# Patient Record
Sex: MALE | Race: WHITE | NOT HISPANIC OR LATINO | Employment: FULL TIME | URBAN - METROPOLITAN AREA
[De-identification: names, ages, dates, MRNs, and addresses within clinical notes are randomized per-mention and may not be internally consistent; named-entity substitution may affect disease eponyms.]

---

## 2019-06-03 ENCOUNTER — TRANSFERRED RECORDS (OUTPATIENT)
Dept: HEALTH INFORMATION MANAGEMENT | Facility: CLINIC | Age: 39
End: 2019-06-03

## 2019-09-17 ENCOUNTER — OFFICE VISIT (OUTPATIENT)
Dept: FAMILY MEDICINE | Facility: CLINIC | Age: 39
End: 2019-09-17
Payer: COMMERCIAL

## 2019-09-17 VITALS
DIASTOLIC BLOOD PRESSURE: 86 MMHG | RESPIRATION RATE: 16 BRPM | WEIGHT: 167 LBS | SYSTOLIC BLOOD PRESSURE: 138 MMHG | OXYGEN SATURATION: 99 % | TEMPERATURE: 98.5 F | HEIGHT: 69 IN | BODY MASS INDEX: 24.73 KG/M2 | HEART RATE: 79 BPM

## 2019-09-17 DIAGNOSIS — F90.9 ATTENTION DEFICIT HYPERACTIVITY DISORDER (ADHD), UNSPECIFIED ADHD TYPE: ICD-10-CM

## 2019-09-17 DIAGNOSIS — Z00.00 ENCOUNTER FOR PREVENTIVE HEALTH EXAMINATION: Primary | ICD-10-CM

## 2019-09-17 PROCEDURE — 99385 PREV VISIT NEW AGE 18-39: CPT | Performed by: PHYSICIAN ASSISTANT

## 2019-09-17 RX ORDER — METHYLPHENIDATE HYDROCHLORIDE 54 MG/1
54 TABLET ORAL DAILY
Qty: 30 TABLET | Refills: 0 | Status: SHIPPED | OUTPATIENT
Start: 2019-10-03 | End: 2019-09-17

## 2019-09-17 RX ORDER — METHYLPHENIDATE HYDROCHLORIDE 54 MG/1
54 TABLET ORAL DAILY
Refills: 0 | COMMUNITY
Start: 2019-09-03 | End: 2019-09-17

## 2019-09-17 RX ORDER — METHYLPHENIDATE HYDROCHLORIDE 54 MG/1
54 TABLET ORAL DAILY
Qty: 30 TABLET | Refills: 0 | Status: SHIPPED | OUTPATIENT
Start: 2019-11-03 | End: 2019-09-17

## 2019-09-17 RX ORDER — METHYLPHENIDATE HYDROCHLORIDE 54 MG/1
54 TABLET ORAL DAILY
Qty: 30 TABLET | Refills: 0 | Status: SHIPPED | OUTPATIENT
Start: 2019-12-03 | End: 2020-03-03

## 2019-09-17 SDOH — HEALTH STABILITY: MENTAL HEALTH: HOW OFTEN DO YOU HAVE A DRINK CONTAINING ALCOHOL?: NEVER

## 2019-09-17 ASSESSMENT — MIFFLIN-ST. JEOR: SCORE: 1662.89

## 2019-09-17 NOTE — PROGRESS NOTES
"Subjective     Loki Aguirre is a 39 year old male who presents to clinic today for the following health issues:    HPI     Concerta med check and refill.  Was started It about 1.5 yrs ago. Was initially started in [place].   Did have evaluation in [place], no copies at times of visit. Is going to have records sent.  No weight concerns or sleeping difficulties.      Also establish care with PCP      Patient Active Problem List   Diagnosis     Attention deficit hyperactivity disorder (ADHD), unspecified ADHD type     Past Surgical History:   Procedure Laterality Date     wisdom teeth         Social History     Tobacco Use     Smoking status: Never Smoker     Smokeless tobacco: Never Used   Substance Use Topics     Alcohol use: Not Currently     Frequency: Never     Family History   Problem Relation Age of Onset     Breast Cancer Mother      Depression Mother      Hypertension Mother      Arthritis Mother      Cataracts Father      Lupus Maternal Grandmother      Neurologic Disorder Paternal Grandfather          Current Outpatient Medications   Medication Sig Dispense Refill     [START ON 12/3/2019] methylphenidate (CONCERTA) 54 MG CR tablet Take 1 tablet (54 mg) by mouth daily 30 tablet 0     No Known Allergies    Reviewed and updated as needed this visit by Provider  Tobacco  Allergies  Meds  Problems  Med Hx  Surg Hx  Fam Hx         Review of Systems   ROS COMP: Constitutional, HEENT, cardiovascular, pulmonary, gi and gu systems are negative, except as otherwise noted.      Objective    /86   Pulse 79   Temp 98.5  F (36.9  C) (Oral)   Resp 16   Ht 1.753 m (5' 9\")   Wt 75.8 kg (167 lb)   SpO2 99%   BMI 24.66 kg/m    Body mass index is 24.66 kg/m .  Physical Exam   GENERAL: healthy, alert and no distress  EYES: Eyes grossly normal to inspection, PERRL and conjunctivae and sclerae normal  HENT: ear canals and TM's normal, nose and mouth without ulcers or lesions  NECK: no adenopathy, no asymmetry, masses, " or scars and thyroid normal to palpation  RESP: lungs clear to auscultation - no rales, rhonchi or wheezes  CV: regular rate and rhythm, normal S1 S2, no S3 or S4, no murmur, click or rub, no peripheral edema and peripheral pulses strong  ABDOMEN: soft, nontender, no hepatosplenomegaly, no masses and bowel sounds normal   (male): normal male genitalia without lesions or urethral discharge, no hernia  MS: no gross musculoskeletal defects noted, no edema  SKIN: no suspicious lesions or rashes  NEURO: Normal strength and tone, mentation intact and speech normal  PSYCH: mentation appears normal, affect normal/bright    Diagnostic Test Results:  none   Just had them done in [place]. No records at time of visit.         Assessment & Plan       ICD-10-CM    1. Encounter for preventive health examination Z00.00    2. Attention deficit hyperactivity disorder (ADHD), unspecified ADHD type F90.9 methylphenidate (CONCERTA) 54 MG CR tablet     DISCONTINUED: methylphenidate (CONCERTA) 54 MG CR tablet     DISCONTINUED: methylphenidate (CONCERTA) 54 MG CR tablet   1. Work on Healthy diet and exercise. Getting heart rate elevated for 30 mins most days of week.  Recheck yearly.   2. Ok for refills. Follow up  6 months. warning signs discussed.     Return in about 6 months (around 3/17/2020) for ADHD Check.    Yusuf Vargas PA-C  [facility]

## 2019-12-23 ENCOUNTER — TELEPHONE (OUTPATIENT)
Dept: FAMILY MEDICINE | Facility: CLINIC | Age: 39
End: 2019-12-23

## 2019-12-23 NOTE — TELEPHONE ENCOUNTER
Patient calling is on methylphenidate, after first of year his insurance will no longer cover this. Wondering if Yusuf would sent a statement of need to insurance or does he think patient should try a covered medication. Patient does not have a list of covered meds. Patient states he has 1 refill left.

## 2019-12-23 NOTE — TELEPHONE ENCOUNTER
Patient states is picking up last refill on his methylphenidate tomorrow.  He states that he's been on the med for approx 2 years; is only med he's used for his adhd.  He will contact his insurance company and find out which medications are covered by them for adhd and then he will leave his provider a message with the information.  Sania Hernandez RN

## 2020-03-02 ENCOUNTER — TELEPHONE (OUTPATIENT)
Dept: FAMILY MEDICINE | Facility: CLINIC | Age: 40
End: 2020-03-02

## 2020-03-02 DIAGNOSIS — J02.9 SORE THROAT: Primary | ICD-10-CM

## 2020-03-02 NOTE — TELEPHONE ENCOUNTER
Reason for Call:  Other prescription    Detailed comments: Patient wanted to give Oppel a heads up that the patient will be weaning off of his medication: methylphenidate (CONCERTA) 54 MG.   Patient will be off of his wife's insurance and will not have any until he gets his own. Patient did want to mention that he is not having any symptoms or side affects as of now.     Patient stated that he is now living with other relatives and the phone number changed as well. Both are updated on file    Phone Number Patient can be reached at: Cell number on file:    Telephone Information:   Mobile [phone]       Best Time: Any    Can we leave a detailed message on this number? YES    Call taken on 3/2/2020 at 4:48 PM by Georgia Melgar

## 2020-03-02 NOTE — TELEPHONE ENCOUNTER
Left message on answering machine for patient to call back. Sania FELIX, [phone]  Per :  concerta last refilled 12/23/19 #30

## 2020-03-03 NOTE — TELEPHONE ENCOUNTER
"Patient is in process of weaning off the concerta 54mg CR tab. Patient is going through a divorce and his insurance is currently through his wife.  Once divorce finalized he will not have insurance to cover.  He states that his current insurance also let him know med no longer covered under wife's insurance.  He is not requesting PA or alternate med at this time.  He last filled #30 on 12/3/19.  He states he has approx 10 pills left; has been taking one tab every 2-3 days when he works and not at all on weekends.  He states is having no side effects that he can tell from weaning off med.  Wondering if he should continue just taking the med every 2-3 days and then stop when done with pills or if Yusuf Vargas PA-C feels should do another way (has 10 pills; is not refilling).  He states is \"homeless\".  Living with alternate family members currently in [facility].  Has applied for housing and waiting to hear back.  He is looking at joining [employer] and states awareness that he cannot be on the Concerta or have it in his system (they do drug testing with application).    States ok for staff to leave detailed message on his voicemail with instructions from his provider.  Sania Hernandez RN    "

## 2020-03-03 NOTE — TELEPHONE ENCOUNTER
I called and spoke with patient.  Gave him Yusuf Vargas PA-C's instructions as per below.  He states understanding.  Sania Hernandez RN

## 2020-03-30 ENCOUNTER — VIRTUAL VISIT (OUTPATIENT)
Dept: FAMILY MEDICINE | Facility: OTHER | Age: 40
End: 2020-03-30

## 2020-03-30 NOTE — PROGRESS NOTES
"Date: 2020 11:21:42  Clinician: Fara Cano  Clinician NPI: [id number]  Patient: Loki Aguirre  Patient : 1980  Patient Address: [address] [place] [address]  Patient Phone: [phone]  Visit Protocol: URI  Patient Summary:  Loki is a 39 year old ( : 1980 ) male who initiated a Visit for COVID-19 (Coronavirus) evaluation and screening. When asked the question \"Please sign me up to receive news, health information and promotions from [employer].\", Loki responded \"Yes\".    Loki states his symptoms started 1-2 days ago.   His symptoms consist of a headache, facial pain or pressure, myalgia, chills, wheezing, a sore throat, a cough, nasal congestion, malaise, and enlarged lymph nodes. He is experiencing mild difficulty breathing with activities but can speak normally in full sentences. Loki also feels feverish but was unable to measure his temperature.   Symptom details     Nasal secretions: The color of his mucus is blood-tinged.    Cough: Loki coughs every 5-10 minutes and his cough is more bothersome at night. Phlegm comes into his throat when he coughs. He does not believe his cough is caused by post-nasal drip. The color of the phlegm is blood-tinged.     Sore throat: Loki reports having moderate throat pain (4-6 on a 10 point pain scale), has exudate on his tonsils, and can swallow liquids. The lymph nodes in his neck are enlarged. A rash has not appeared on the skin since the sore throat started.     Wheezing: Lkoi has been diagnosed with asthma. The wheezing interferes with his normal daily activities.    Facial pain or pressure: The facial pain or pressure does not feel worse when bending or leaning forward.     Headache: He states the headache is moderate (4-6 on a 10 point pain scale).      Loki denies having ear pain, rhinitis, and teeth pain. He also denies having a sinus infection within the past year, taking antibiotic medication for the symptoms, and " having recent facial or sinus surgery in the past 60 days.   Precipitating events  Within the past week, Loki has not been exposed to someone with strep throat. He has recently been exposed to someone with influenza. Loki has been in close contact with the following high risk individuals: people with asthma, heart disease or diabetes, adults 65 or older, and immunocompromised people.   Pertinent COVID-19 (Coronavirus) information  Loki has traveled internationally or to the areas where COVID-19 (Coronavirus) is widespread, including cruise ship travel in the last 14 days before the start of his symptoms. Countries or locations traveled as reported by the patient (free text): No out of state travel.  But I was on a business trip in [place], [place] last week.  A person left from one of the grocery stores I was shopping at with uncontrollable coughing that I was in line with.  I also had a few home visits with clients in the [place] area.   Loki has not had a close contact with a laboratory-confirmed COVID-19 patient within 14 days of symptom onset. He also has not had a close contact with a suspected COVID-19 patient within 14 days of symptom onset.   Loki is not a healthcare worker or a  and does not work in a healthcare facility. He does not live with a healthcare worker.   Triage Point(s) temporarily suspended for COVID-19 (Coronavirus) screening  Loki reported the following symptoms which were previously protocol referral points. These protocol referral points have temporarily been removed for purposes of COVID-19 (Coronavirus) screening.   Wheezing that keeps Loki from doing daily activities   Pertinent medical history  Loik needs a return to work/school note.   Weight: 165 lbs   Loki does not smoke or use smokeless tobacco.   Additional information as reported by the patient (free text): I have been homeless since [place] because I am going through a divorce.  I used to have an  inhaler in the past, but it  in .  I unable to be in complete isolation as I live in my car and still need to use a public restroom.  I was living with my elderly aunt and uncle but removed myself two days before symptoms started.  I feel a constant pain in my upper right lung area that increases with coughing.  My symptoms have increased rapidly since last night.   Weight: 165 lbs    MEDICATIONS: No current medications, ALLERGIES: NKDA  Clinician Response:  Dear Loki,   Based on the information you have provided, you do have symptoms that are consistent with Coronavirus (COVID-19).  The coronavirus causes mild to severe respiratory illness with the most common symptoms including fever, cough and difficulty breathing. Unfortunately, many viruses cause similar symptoms and it can be difficult to distinguish between viruses, especially in mild cases, so we are presuming that anyone with cough or fever has coronavirus at this time.  Coronavirus/COVID-19 has reached the point of community spread in [place], meaning that we are finding the virus in people with no known exposure risk for kamryn the virus. Given the increasing commonness of coronavirus in the community we are no longer testing patients who are not critically ill.  If you are a health care worker, you should refer to your employee health office for instructions about testing and returning to work.  For everyone else who has cough or fever, you should assume you are infected with coronavirus. Since you will not be tested but have symptoms that may be consistent with coronavirus, the CDC recommends you stay in self-isolation until these three things have happened:    You have had no fever for at least 72 hours (that is three full days of no fever without the use of medicine that reduces fevers)    AND   Other symptoms have improved (for example, when your cough or shortness of breath have improved)   AND   At least 7 days have passed since  your symptoms first appeared.   How to Isolate:   Isolate yourself at home.  Do Not allow any visitors  Do Not go to work or school  Do Not go to [employer],  centers, shopping, or other public places.  Do Not shake hands.  Avoid close contact with others (hugging, kissing).   Protect Others:   Cover Your Mouth and Nose with a mask, disposable tissue or wash cloth to avoid spreading germs to others.  Wash your hands and face frequently with soap and water.   We know it can be scary to hear that you might have COVID-19. Our team can help track your symptoms and make sure you are doing ok over the next two weeks using a program called [employer] to keep in touch. When you receive an email from [employer], please consider enrolling in our monitoring program. There is no cost to you for monitoring. Here is a URL where you can learn more: [email or web address]/333493  Managing Symptoms:   At this time, we primarily recommend Tylenol (Acetaminophen) for fever or pain. If you have liver or kidney problems, contact your primary care provider for instructions on use of tylenol. Adults can take 650 mg (two 325 mg pills) by mouth every 4-6 hours as needed OR 1,000 mg (two 500 mg pills) every 8 hours as needed. MAXIMUM DAILY DOSE: 3,000mg. For children, refer to dosing on bottle based on age or weight.   If you develop significant shortness of breath that prevents you from doing normal activities, please call 911 or proceed to the nearest emergency room and alert them immediately that you have been in self-isolation for possible coronavirus.  If you have a higher risk medical condition such as cancer, heart failure, end stage renal disease on dialysis or have a transplant, please reach out to your specialist's clinic to advise them of your [facility] visit should you not improve within the next two days.   For more information about COVID19 and options for caring for yourself at home, please visit the CDC website at  https://www.cdc.gov/coronavirus/2019-ncov/about/steps-when-sick.htmlFor more options for care at [facility], please visit our website at [email or web address]    COVID-19 (Coronavirus) General Information  With the increase in the number of COVID-19 (Coronavirus) cases, we understand you may have some questions. Below is some helpful information on COVID-19 (Coronavirus).  How can I protect myself and others from the COVID-19 (Coronavirus)?  Because there is currently no vaccine to prevent infection, the best way to protect yourself is to avoid being exposed to this virus. Put distance between yourself and other people if COVID-19 (Coronavirus) is spreading in your community. The virus is thought to spread mainly from person-to-person.     Between people who are in close contact with one another (within about 6 about) for a prolonged period (10 minutes or longer).    Through respiratory droplets produced when an infected person coughs or sneezes.     The CDC recommends the following additional steps to protect yourself and others:     Wash your hands often with soap and water for at least 20 seconds, especially after blowing your nose, coughing, or sneezing; going to the bathroom; and before eating or preparing food.  Use an alcohol-based hand  that contains at least 60 percent alcohol if soap and water are not available.        Avoid touching your eyes, nose and mouth with unwashed hands.    Avoid close contact with people who are sick.    Stay home when you are sick.    Cover your cough or sneeze with a tissue, then throw the tissue in the trash.    Clean and disinfect frequently touched objects and surfaces.     You can help stop COVID-19 (Coronavirus) by knowing the signs and symptoms:     Fever    Cough    Shortness of breath     Contact your healthcare provider if   Develop symptoms   AND   Have been in close contact with a person known to have COVID-19 (Coronavirus)  or live in or have recently traveled from an area with ongoing spread of COVID-19 (Coronavirus). Call ahead before you go to a doctor's office or emergency room. Tell them about your recent travel and your symptoms.   For the most up to date information, visit the CDC's website.  Self-monitoring  Self-monitoring means people should monitor themselves for fever by taking their temperatures twice a day and remain alert for a cough or difficulty breathing.  It is important to check your health two times each day for 14 days after a potential exposure to a person with COVID-19 (Coronavirus) or after travel from a location where COVID-19 (Coronavirus) is widespread. If you have been exposed to a person with COVID-19 (Coronavirus), it may take up to 14 days to know if you will get sick. Follow the steps below to check and record your health.     Take your temperature with a thermometer twice a day, once in the morning and once in the evening, and watch for a cough or difficulty breathing for 14 days.    Write down your temperature and any COVID-19 symptoms you may have: feeling feverish, coughing, or difficulty breathing.    Stay home from work or school.    Do not take public transportation, taxis, or ride-shares.    Avoid crowded places (such as shopping centers and movie theaters) and limit your activities in public.    Keep your distance from others (about 6 feet or 2 meters).    If you get sick with fever, cough, or trouble breathing, contact your healthcare provider and tell them about your recent travel and/or your symptoms.    If you need to seek medical care for other reasons, such as dialysis, call ahead to your doctor and tell them about your recent travel.     Steps to help prevent the spread of COVID-19 (Coronavirus) if you are sick  If you are sick with COVID-19 (Coronavirus) or suspect you are infected with the virus that causes COVID-19 (Coronavirus), follow the steps below to help prevent the disease from  "spreading&nbsp;to people in your home and community.     Stay home except to get medical care. Home isolation may be started in consultation with your healthcare clinician.    Separate yourself from other people and animals in your home.    Call ahead before visiting your doctor if you have a medical appointment.    Wear a facemask when you are around other people.    Cover your cough and sneezes.    Clean your hands often.    Avoid sharing personal household items.    Clean and disinfect frequently touched objects and surfaces everyday.    You will need to have someone drop off medications or household supplies (if needed) at your house without coming inside or in contact with you or others living in your house.    Monitor your symptoms and seek prompt medical care if your illness is worsening (e.g. Difficulty breathing).    Discontinue home isolation only in consultation with your healthcare provider.     For more detailed and up to date information on what to do if you are sick, visit this link: What to Do If You Are Sick With COVID-19.  Do I need to be tested for COVID-19 (Coronavirus)?     Not everyone needs to be tested for COVID-19 (Coronavirus). Decisions on which patients receive testing will be based on the local spread of COVID-19 (Coronavirus) as well as the symptoms. Your healthcare provider will make the final decision on whether you should be tested.    In the meantime, if you have concerns that you may have been exposed, it is reasonable to practice \"social distancing.\"&nbsp; If you are ill with a cold or flu-like illness, please monitor your symptoms and call your healthcare provider if your symptoms worsen.    For more up to date information, visit this link: COVID-19 (Coronavirus) Frequently Asked Questions and Answers.      Diagnosis: Cough  Diagnosis ICD: R05  Additional Clinician Notes: I am going to send in an inhaler for you, and also an antibiotic as you say you have exudate of throat, to " cover a possible strep. I want you to see how you do, and I am going to refer you to the get well loop that monitors you and also can treat you, or direct you where to go, if needed. it is online as well. Information in notes above. You can always re submit an oncare visit. Take care  Prescription: albuterol sulfate (Ventolin HFA) 90 mcg/actuation inhalation HFA aerosol inhaler 1 60 inhalation canister (ventolin hfa or equivalent), 0 days supply. Inhale 2 puffs every 4 hours as needed. Refills: 0, Refill as needed: no, Allow substitutions: yes  Prescription: azithromycin (Zithromax Z-Emilio) 250 mg oral tablet 6 tablet, 5 days supply. Take 2 tablets by mouth 1 day and then 1 tab daily for 4 days. Refills: 0, Refill as needed: no, Allow substitutions: yes  Pharmacy: [id number] - [phone] - [id number] [place] [address]

## 2020-04-23 ENCOUNTER — NURSE TRIAGE (OUTPATIENT)
Dept: FAMILY MEDICINE | Facility: CLINIC | Age: 40
End: 2020-04-23

## 2020-04-23 NOTE — TELEPHONE ENCOUNTER
Additional Information    Negative: Difficult to awaken or acting confused (e.g., disoriented, slurred speech)    Negative: New neurologic deficit that is present NOW, sudden onset of ANY of the following: * Weakness of the face, arm, or leg on one side of the body * Numbness of the face, arm, or leg on one side of the body * Loss of speech or garbled speech    Negative: Sounds like a life-threatening emergency to the triager    Negative: Confusion, disorientation, or hallucinations is the main symptom    Negative: Dizziness is the main symptom    Negative: Followed a head injury within last 3 days    Negative: Headache (with neurologic deficit)    Negative: Unable to urinate (or only a few drops) and bladder feels very full    Negative: Loss of control of bowel or bladder (i.e., incontinence) of new onset    Negative: Back pain with numbness (loss of sensation) in groin or rectal area    Negative: Patient sounds very sick or weak to the triager    Negative: Loss of speech or garbled speech is a chronic symptom (recurrent or ongoing problem lasting > 4 weeks)    Negative: Weakness of arm or leg is a chronic symptom (recurrent or ongoing problem lasting > 4 weeks)    Negative: Numbness or tingling in one or both hands is a chronic symptom (recurrent or ongoing problem lasting > 4 weeks)    Negative: Numbness or tingling in one or both feet is a chronic symptom (recurrent or ongoing problem lasting > 4 weeks)    Negative: Numbness or tingling on both sides of body and is a new symptom lasting > 24 hours    Negative: Neurologic deficit that was brief (now gone), ANY of the following: * Weakness of the face, arm, or leg on one side of the body * Numbness of the face, arm, or leg on one side of the body * Loss of speech or garbled speech    Negative: Neurologic deficit of gradual onset, ANY of the following: * Weakness of the face, arm, or leg on one side of the body * Numbness of the face, arm, or leg on one side of  "the body * Loss of speech or garbled speech    Negative: [place] palsy suspected (i.e., weakness only one side of the face, developing over hours to days, no other symptoms)    Negative: Tingling (e.g., pins and needles) of the face, arm or leg on one side of the body, that is  present now    Brief (now gone) tingling in hand (e.g., pins and needles) after prolonged laying on arm    Answer Assessment - Initial Assessment Questions  1. SYMPTOM: \"What is the main symptom you are concerned about?\" (e.g., weakness, numbness)      Weakness in arms, pt woke up with arms over head today and had trouble using arm, especially right arm.  Numbness and tingling has resolved.   2. ONSET: \"When did this start?\" (minutes, hours, days; while sleeping)      Pt woke up at two pm today.  3. LAST NORMAL: \"When was the last time you were normal (no symptoms)?\"      Last night when went to bed, works nights.   4. PATTERN \"Does this come and go, or has it been constant since it started?\"  \"Is it present now?\"      Right arm aches and is weak,   5. CARDIAC SYMPTOMS: \"Have you had any of the following symptoms: chest pain, difficulty breathing, palpitations?\"      Pt is recovering from covid about 3 weeks ago.   6. NEUROLOGIC SYMPTOMS: \"Have you had any of the following symptoms: headache, dizziness, vision loss, double vision, changes in speech, unsteady on your feet?\"      None of these  7. OTHER SYMPTOMS: \"Do you have any other symptoms?\"      none  8. PREGNANCY: \"Is there any chance you are pregnant?\" \"When was your last menstrual period?\"      none    Protocols used: NEUROLOGIC DEFICIT-A-OH    "

## 2020-04-23 NOTE — TELEPHONE ENCOUNTER
Reason for Call:  Other call back    Detailed comments: patient is calling stating woke up with hands over head this morning and was not able to use arms or get them functioning properly. After a few hours arm seem to be able to move again but seem more weak then usual. Wondering if should be seen or if this is a results to sleeping with arms over head. Please call to discuss. Thank you.    Phone Number Patient can be reached at: Home number on file [phone] (home)    Best Time:     Can we leave a detailed message on this number? YES    Call taken on 4/23/2020 at 2:17 PM by Annita Sellers

## 2020-06-20 ENCOUNTER — VIRTUAL VISIT (OUTPATIENT)
Dept: FAMILY MEDICINE | Facility: OTHER | Age: 40
End: 2020-06-20

## 2020-06-20 ENCOUNTER — NURSE TRIAGE (OUTPATIENT)
Dept: NURSING | Facility: CLINIC | Age: 40
End: 2020-06-20

## 2020-06-20 NOTE — TELEPHONE ENCOUNTER
Caller has a headache and temperature is 101.0; Daughter is  PUI due to some symptoms.   Caller believes he had Covid 19  at the end of March but was not tested then and recovered  Caller cannot return to work if he has a fever and not had a negative Covid PCR  Ca ller is advised to self isolate an ddo [facility] viritual visit   Caller understands and will comply   Lona Gardner RN  FNA           COVID 19 Nurse Triage Plan/Patient Instructions    Please be aware that novel coronavirus (COVID-19) may be circulating in the community. If you develop symptoms such as fever, cough, or SOB or if you have concerns about the presence of another infection including coronavirus (COVID-19), please contact your health care provider or visit [email or web address].     Disposition/Instructions    Patient to schedule a Virtual Visit with provider. Reference Visit Selection Guide.    Thank you for taking steps to prevent the spread of this virus.  o Limit your contact with others.  o Wear a simple mask to cover your cough.  o Wash your hands well and often.    Resources    [facility]: About COVID-19: [email or web address]    CDC: What to Do If You're Sick: www.cdc.gov/coronavirus/2019-ncov/about/steps-when-sick.html    CDC: Ending Home Isolation: www.cdc.gov/coronavirus/2019-ncov/hcp/disposition-in-home-patients.html     CDC: Caring for Someone: www.cdc.gov/coronavirus/2019-ncov/if-you-are-sick/care-for-someone.html     [facility]: Interim Guidance for Hospital Discharge to Home: www.health.[facility].mn.us/diseases/coronavirus/hcp/hospdischarge.pdf    [facility] clinical trials (COVID-19 research studies): [email or web address].[facility]/umn-clinical-trials     Below are the COVID-19 hotlines at the [place] Department of Health ([facility]). Interpreters are available.   o For health questions: Call 294-286-9041 or 1-899.396.6453 (7 a.m. to 7 p.m.)  o For questions about schools and childcare: Call 820-778-9787 or 1-802.902.4635 (8  a.m. to 7 p.m.)                       Reason for Disposition    [1] COVID-19 infection suspected by caller or triager AND [2] mild symptoms (cough, fever, or others) AND [3] no complications or SOB    Additional Information    Negative: SEVERE difficulty breathing (e.g., struggling for each breath, speaks in single words)    Negative: Difficult to awaken or acting confused (e.g., disoriented, slurred speech)    Negative: Bluish (or gray) lips or face now    Negative: Shock suspected (e.g., cold/pale/clammy skin, too weak to stand, low BP, rapid pulse)    Negative: Sounds like a life-threatening emergency to the triager    Negative: [1] COVID-19 exposure AND [2] no symptoms    Negative: COVID-19 and Breastfeeding, questions about    Negative: [1] Adult with possible COVID-19 symptoms AND [2] triager concerned about severity of symptoms or other causes    Negative: SEVERE or constant chest pain or pressure (Exception: mild central chest pain, present only when coughing)    Negative: MODERATE difficulty breathing (e.g., speaks in phrases, SOB even at rest, pulse 100-120)    Negative: Patient sounds very sick or weak to the triager    Negative: MILD difficulty breathing (e.g., minimal/no SOB at rest, SOB with walking, pulse <100)    Negative: Chest pain or pressure    Negative: Fever > 103 F (39.4 C)    Negative: [1] Fever > 101 F (38.3 C) AND [2] age > 60    Negative: [1] Fever > 100.0 F (37.8 C) AND [2] bedridden (e.g., nursing home patient, CVA, chronic illness, recovering from surgery)    Negative: HIGH RISK patient (e.g., age > 64 years, diabetes, heart or lung disease, weak immune system)    Negative: Fever present > 3 days (72 hours)    Negative: [1] Fever returns after gone for over 24 hours AND [2] symptoms worse or not improved    Negative: [1] Continuous (nonstop) coughing interferes with work or school AND [2] no improvement using cough treatment per protocol    Protocols used: CORONAVIRUS (COVID-19)  DIAGNOSED OR [id number] 5.16.20

## 2020-06-20 NOTE — TELEPHONE ENCOUNTER
Please have patient be scheduled for Curbside COVID test AND Curbside Strep test. I believe [facility] is doing both of these tests Curbside. I put orders in already. If I forwarded this to the wrong person please forward accordingly. Thanks    Kervin Rowley PA-C

## 2020-06-21 ENCOUNTER — AMBULATORY - HEALTHEAST (OUTPATIENT)
Dept: FAMILY MEDICINE | Facility: CLINIC | Age: 40
End: 2020-06-21

## 2020-06-21 ENCOUNTER — COMMUNICATION - HEALTHEAST (OUTPATIENT)
Dept: FAMILY MEDICINE | Facility: CLINIC | Age: 40
End: 2020-06-21

## 2020-06-21 DIAGNOSIS — Z20.822 SUSPECTED COVID-19 VIRUS INFECTION: ICD-10-CM

## 2020-06-21 DIAGNOSIS — R07.0 THROAT PAIN: ICD-10-CM

## 2020-06-21 DIAGNOSIS — J02.0 STREPTOCOCCAL PHARYNGITIS: ICD-10-CM

## 2020-06-21 LAB — DEPRECATED S PYO AG THROAT QL EIA: ABNORMAL

## 2020-06-21 NOTE — PROGRESS NOTES
"Date: 2020 18:24:57  Clinician: Kervin Rowley  Clinician NPI: [id number]  Patient: Loki Aguirre  Patient : 1980  Patient Address: [address] [place] [id number]  Patient Phone: [phone]  Visit Protocol: URI  Patient Summary:  Loki is a 39 year old ( : 1980 ) male who initiated a Visit for COVID-19 (Coronavirus) evaluation and screening. When asked the question \"Please sign me up to receive news, health information and promotions from [employer].\", Loki responded \"No\".    Loki states his symptoms started today.   His symptoms consist of tooth pain, a sore throat, enlarged lymph nodes, malaise, myalgia, facial pain or pressure, a cough, nasal congestion, ear pain, a headache, and chills. Loki also feels feverish.   Symptom details     Nasal secretions: The color of his mucus is clear.    Cough: Loki coughs a few times an hour and his cough is more bothersome at night. Phlegm comes into his throat when he coughs. He does not believe his cough is caused by post-nasal drip. The color of the phlegm is clear.     Sore throat: Loki reports having mild throat pain (1-3 on a 10 point pain scale), does not have exudate on his tonsils, and can swallow liquids. The lymph nodes in his neck are enlarged. A rash has not appeared on the skin since the sore throat started.     Temperature: His current temperature is 101 degrees Fahrenheit. Loki has had a temperature over 100 degrees Fahrenheit for 1-2 days.     Facial pain or pressure: The facial pain or pressure does not feel worse when bending or leaning forward.     Headache: He states the headache is mild (1-3 on a 10 point pain scale).     Tooth pain: The tooth pain is not caused by a cavity, recent dental work, or other mouth problems.      Loki denies having wheezing, nausea, ageusia, diarrhea, anosmia, vomiting, and rhinitis. He also denies having recent facial or sinus surgery in the past 60 days, taking antibiotic " medication for the symptoms, and having a sinus infection within the past year. He is not experiencing dyspnea.   Precipitating events  Within the past week, Loki has not been exposed to someone with strep throat. He has not recently been exposed to someone with influenza. Loki has been in close contact with the following high risk individuals: immunocompromised people and people with asthma, heart disease or diabetes.   Pertinent COVID-19 (Coronavirus) information  In the past 14 days, Loki has not worked in a congregate living setting.   He does not work or volunteer as healthcare worker or a  and does not work or volunteer in a healthcare facility.   Loki also has not lived in a congregate living setting in the past 14 days. He does not live with a healthcare worker.   Loki has had a close contact with a laboratory-confirmed COVID-19 patient within 14 days of symptom onset. Additional information about contact with COVID-19 (Coronavirus) patient as reported by the patient (free text): My daughter is waiting on testing to come back.  I saw her on Tuesday, she has lost her sense of taste and smell.   Pertinent medical history  Loki needs a return to work/school note.   Weight: 160 lbs   Loki does not smoke or use smokeless tobacco.   Additional information as reported by the patient (free text): Due to risk of exposure and having a temperature, I am unable to return to work unless I have a negative test.  Since I had been diagnosed previously in March, I expect to have some resistance to Covid and may not present the same symptoms as severely this time.  The back of my throat is sore like it was when I had Covid before.  It is also splotchy and very irritated.  It is early in my symptoms, so i don't see much white splotches yet.  My right lymph node is swollen but the left is not.   Weight: 160 lbs  A synchronous phone visit was initiated by the provider for the following reason:  "discuss case    MEDICATIONS: No current medications, ALLERGIES: NKDA  Clinician Response:  Dear Loki,   Your symptoms show that you may have coronavirus (COVID-19). This illness can cause fever, cough and trouble breathing. Many people get a mild case and get better on their own. Some people can get very sick.  What should I do?  We would like to test you for this virus.   1. Please call [phone] to schedule your visit. Explain that you were referred by [facility] to have a COVID-19 test. Be ready to share your [facility] visit ID number.  The following will serve as your written order for this COVID Test, ordered by me, for the indication of suspected COVID [Z20.828]: The test will be ordered in [employer], our electronic health record, after you are scheduled. It will show as ordered and authorized by Denzel Vallejo MD.  Order: COVID-19 (Coronavirus) PCR for SYMPTOMATIC testing from [facility].      2. When it's time for your COVID test:  Stay at least 6 feet away from others. (If someone will drive you to your test, stay in the backseat, as far away from the  as you can.)   Cover your mouth and nose with a mask, tissue or washcloth.  Go straight to the testing site. Don't make any stops on the way there or back.      3.Starting now: Stay home and away from others (self-isolate) until:   You've had no fever---and no medicine that reduces fever---for 3 full days (72 hours). And...   Your other symptoms have gotten better. For example, your cough or breathing has improved. And...   At least 10 days have passed since your symptoms started.       During this time, don't leave the house except for testing or medical care.   Stay in your own room, even for meals. Use your own bathroom if you can.   Stay away from others in your home. No hugging, kissing or shaking hands. No visitors.  Don't go to work, school or anywhere else.    Clean \"high touch\" surfaces often (doorknobs, counters, handles, etc.). Use a household cleaning " spray or wipes. You'll find a full list of  on the EPA website: www.epa.gov/pesticide-registration/list-n-disinfectants-use-against-sars-cov-2.   Cover your mouth and nose with a mask, tissue or washcloth to avoid spreading germs.  Wash your hands and face often. Use soap and water.  Caregivers in these groups are at risk for severe illness due to COVID-19:  o People 65 years and older  o People who live in a nursing home or long-term care facility  o People with chronic disease (lung, heart, cancer, diabetes, kidney, liver, immunologic)  o People who have a weakened immune system, including those who:   Are in cancer treatment  Take medicine that weakens the immune system, such as corticosteroids  Had a bone marrow or organ transplant  Have an immune deficiency  Have poorly controlled HIV or AIDS  Are obese (body mass index of 40 or higher)  Smoke regularly   o Caregivers should wear gloves while washing dishes, handling laundry and cleaning bedrooms and bathrooms.  o Use caution when washing and drying laundry: Don't shake dirty laundry, and use the warmest water setting that you can.  o For more tips, go to www.cdc.gov/coronavirus/2019-ncov/downloads/10Things.pdf.      How can I take care of myself?   Get lots of rest. Drink extra fluids (unless a doctor has told you not to).   Take Tylenol (acetaminophen) for fever or pain. If you have liver or kidney problems, ask your family doctor if it's okay to take Tylenol.   Adults can take either:    650 mg (two 325 mg pills) every 4 to 6 hours, or...   1,000 mg (two 500 mg pills) every 8 hours as needed.    Note: Don't take more than 3,000 mg in one day. Acetaminophen is found in many medicines (both prescribed and over-the-counter medicines). Read all labels to be sure you don't take too much.   For children, check the Tylenol bottle for the right dose. The dose is based on the child's age or weight.    If you have other health problems (like cancer, heart  failure, an organ transplant or severe kidney disease): Call your specialty clinic if you don't feel better in the next 2 days.       Know when to call 911. Emergency warning signs include:    Trouble breathing or shortness of breath Pain or pressure in the chest that doesn't go away Feeling confused like you haven't felt before, or not being able to wake up Bluish-colored lips or face.  Where can I get more information?   [facility] -- About COVID-19: [email or web address]   CDC -- What to Do If You're Sick: www.cdc.gov/coronavirus/2019-ncov/about/steps-when-sick.html   CDC -- Ending Home Isolation: www.cdc.gov/coronavirus/2019-ncov/hcp/disposition-in-home-patients.html   CDC -- Caring for Someone: www.cdc.gov/coronavirus/2019-ncov/if-you-are-sick/care-for-someone.html   [facility] -- Interim Guidance for Hospital Discharge to Home: www.[facility].mn./diseases/coronavirus/hcp/hospdischarge.pdf   [facility] clinical trials (COVID-19 research studies): clinicalaffairs.[facility]-clinical-trials    Below are the COVID-19 hotlines at the [place] Department of Health ([facility]). Interpreters are available.    For health questions: Call 542-670-9927 or 1-267.835.8111 (7 a.m. to 7 p.m.) For questions about schools and childcare: Call 276-261-5794 or 1-439.480.8187 (7 a.m. to 7 p.m.)    Diagnosis: Acute pharyngitis, unspecified  Diagnosis ICD: J02.9  Triage Notes: I reviewed the patient's history, verified their identity, and explained the Visit process.    See telephone encounter in Epic. Patient symptoms as above are concerning for COVID but I am more concerned for Strep and want him to be tested for both. He had presumed COVID in March but was never tested  Synchronous Triage: phone, status: completed, duration: 411 seconds

## 2020-06-21 NOTE — TELEPHONE ENCOUNTER
Called patient- he states he had these tests done this morning. The strep test came back positive and he was treated with antibiotics. Closing encounter.     Jovita Wong [facility]

## 2020-06-23 ENCOUNTER — COMMUNICATION - HEALTHEAST (OUTPATIENT)
Dept: FAMILY MEDICINE | Facility: CLINIC | Age: 40
End: 2020-06-23

## 2020-06-27 ENCOUNTER — COMMUNICATION - HEALTHEAST (OUTPATIENT)
Dept: FAMILY MEDICINE | Facility: CLINIC | Age: 40
End: 2020-06-27

## 2020-08-24 ENCOUNTER — OFFICE VISIT (OUTPATIENT)
Dept: URGENT CARE | Facility: URGENT CARE | Age: 40
End: 2020-08-24
Payer: COMMERCIAL

## 2020-08-24 VITALS
BODY MASS INDEX: 22.98 KG/M2 | SYSTOLIC BLOOD PRESSURE: 143 MMHG | WEIGHT: 155.6 LBS | OXYGEN SATURATION: 99 % | RESPIRATION RATE: 12 BRPM | DIASTOLIC BLOOD PRESSURE: 88 MMHG | TEMPERATURE: 98.6 F | HEART RATE: 75 BPM

## 2020-08-24 DIAGNOSIS — H53.8 BLURRED VISION: Primary | ICD-10-CM

## 2020-08-24 PROCEDURE — 99214 OFFICE O/P EST MOD 30 MIN: CPT | Performed by: FAMILY MEDICINE

## 2020-08-24 ASSESSMENT — ENCOUNTER SYMPTOMS
ABDOMINAL PAIN: 0
EYE PAIN: 1
SHORTNESS OF BREATH: 0
TREMORS: 0
DYSURIA: 0
FEVER: 0
DIAPHORESIS: 0
NAUSEA: 0
DIZZINESS: 0
VOMITING: 0
BACK PAIN: 0
WEAKNESS: 0
DIARRHEA: 0
PALPITATIONS: 0
NECK PAIN: 0
BRUISES/BLEEDS EASILY: 0
CHILLS: 0
RHINORRHEA: 0
COUGH: 0
WOUND: 0
SORE THROAT: 0

## 2020-08-24 ASSESSMENT — PAIN SCALES - GENERAL: PAINLEVEL: MILD PAIN (3)

## 2020-08-24 NOTE — NURSING NOTE
Right eye: 20/50  Left eye: 20/20  Both eyes: 20/20    Glasses worn for testing    Jovita Wong CMA

## 2020-08-24 NOTE — PROGRESS NOTES
SUBJECTIVE:   Loki Aguirre is a 39 year old male presenting with a chief complaint of   Chief Complaint   Patient presents with     Eye Problem     Pain behind right eye off and on since having COVID in March. Sensitivity to artificial light, vision changes, migraines. feels like there is something stuck in his eye, hurts to move eye        Loki is a 39-year-old male presenting today with acute onset blurry vision over the past 36 hours he is also had ongoing right eye pressure seems to be kind of behind the eye over the past couple of months.  He is in the process of seeing an eye doctor.      His past history is significant for COVID in March during that time of 2 weeks of extreme fatigue interestingly has exercise-induced asthma attributable to a lung injury from should dust where he was hospitalized with shock at that point time.  His asthma has improved markedly since his COVID and no longer uses albuterol whatsoever.  He only used albuterol previously when exercising.      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    Denies hx of headache.      Review of Systems   Constitutional: Negative for chills, diaphoresis and fever.   HENT: Negative for congestion, ear pain, rhinorrhea and sore throat.    Eyes: Positive for pain (pressure behind the eye and pain improved markedly after proparacaine ) and visual disturbance.   Respiratory: Negative for cough and shortness of breath.    Cardiovascular: Negative for chest pain and palpitations.   Gastrointestinal: Negative for abdominal pain, diarrhea, nausea and vomiting.   Genitourinary: Negative for dysuria and testicular pain.   Musculoskeletal: Negative for back pain and neck pain.   Skin: Negative for rash and wound.   Allergic/Immunologic: Negative for environmental allergies and food allergies.    Neurological: Negative for dizziness, tremors and weakness.   Hematological: Does not bruise/bleed easily.   Psychiatric/Behavioral:        Hx of depression in high school and current divorce for psychologist for divorce related.        Past Medical History:   Diagnosis Date     NO ACTIVE PROBLEMS      Family History   Problem Relation Age of Onset     Breast Cancer Mother      Depression Mother      Hypertension Mother      Arthritis Mother      Cataracts Father      Lupus Maternal Grandmother      Neurologic Disorder Paternal Grandfather      Current Outpatient Medications   Medication Sig Dispense Refill     aspirin-acetaminophen-caffeine (EXCEDRIN MIGRAINE) 250-250-65 MG tablet Take 1 tablet by mouth every 6 hours as needed for headaches       Social History     Tobacco Use     Smoking status: Never Smoker     Smokeless tobacco: Never Used   Substance Use Topics     Alcohol use: Not Currently     Frequency: Never       OBJECTIVE  BP (!) 143/88   Pulse 75   Temp 98.6  F (37  C) (Oral)   Resp 12   Wt 70.6 kg (155 lb 9.6 oz)   SpO2 99%   BMI 22.98 kg/m      Physical Exam  HENT:      Head: Normocephalic.   Eyes:      General: Lids are normal.      Extraocular Movements: Extraocular movements intact.      Conjunctiva/sclera: Conjunctivae normal.      Pupils: Pupils are equal, round, and reactive to light.      Slit lamp exam:     Right eye: No corneal ulcer, foreign body, hyphema or photophobia.      Left eye: No corneal ulcer, foreign body, hyphema or photophobia. Left eye hypopyon:         Comments: Discomfort under the eyelid did improve some with instillation of topical proparacaine fluorescein exam was unremarkable for abrasions or consolidation.  Fluorescein exam    Clear cornea clear   Neck:      Musculoskeletal: Normal range of motion.   Cardiovascular:      Rate and Rhythm: Normal rate.      Pulses: Normal pulses.   Pulmonary:      Effort: Pulmonary effort is normal.   Neurological:      Mental  Status: He is alert.           Vision screen 20/25 in the right 20/20 in the left with glasses    ASSESSMENT:    ICD-10-CM    1. Blurred vision  H53.8           PLAN:  Immediately to the ER for any worsening.   Encourage him to see the optometrist or ophthalmologist in the next 24 to 48 hours for intraocular pressure check likely need a slit-lamp examination and visual acuity testing has been over a year since his last visual screen.  The patient indicates understanding of these issues and agrees with the plan.   Patient educational/instructional material provided including reasons for follow-up   Randall Hargrove MD

## 2021-01-03 ENCOUNTER — HEALTH MAINTENANCE LETTER (OUTPATIENT)
Age: 41
End: 2021-01-03

## 2021-03-08 ENCOUNTER — NURSE TRIAGE (OUTPATIENT)
Dept: FAMILY MEDICINE | Facility: CLINIC | Age: 41
End: 2021-03-08

## 2021-03-08 NOTE — TELEPHONE ENCOUNTER
"    Reason for Disposition    Chest pain lasting longer than 5 minutes and ANY of the following:* Over 50 years old* Over 30 years old and at least one cardiac risk factor (i.e., high blood pressure, diabetes, high cholesterol, obesity, smoker or strong family history of heart disease)* Pain is crushing, pressure-like, or heavy * Took nitroglycerin and chest pain was not relieved* History of heart disease (i.e., angina, heart attack, bypass surgery, angioplasty, CHF)    Additional Information    Negative: Severe difficulty breathing (e.g., struggling for each breath, speaks in single words)    Negative: Passed out (i.e., fainted, collapsed and was not responding)    Answer Assessment - Initial Assessment Questions  1. LOCATION: \"Where does it hurt?\"        Pain to left of sternum  2. RADIATION: \"Does the pain go anywhere else?\" (e.g., into neck, jaw, arms, back)      Sometimes will radiate slightly up to neck.  Unsure of arm; states has had edema issues left arm since had COVID19 a year ago.  He states was given diagnosis of COVID19 but never tested because they didn't have the test available at that time.  3. ONSET: \"When did the chest pain begin?\" (Minutes, hours or days)       4 days ago.    4. PATTERN \"Does the pain come and go, or has it been constant since it started?\"  \"Does it get worse with exertion?\"       States has been pretty constant.  Gets better when he takes 2 Excedrin Migraine but is still present.    5. DURATION: \"How long does it last\" (e.g., seconds, minutes, hours)      States is constant.  Lightens up when he is doing nothing or is relaxed.  Doesn't go away completed.  6. SEVERITY: \"How bad is the pain?\"  (e.g., Scale 1-10; mild, moderate, or severe)     - MILD (1-3): doesn't interfere with normal activities      - MODERATE (4-7): interferes with normal activities or awakens from sleep     - SEVERE (8-10): excruciating pain, unable to do any normal activities        States when gets really bad " "is 8/10.   feels like he has \"an extra fist sitting in his chest and can't take full breath\".  States is a 2-3 with scheduled Excedrin.  Sometimes is short/stabbing, otherwise can be dull.    7. CARDIAC RISK FACTORS: \"Do you have any history of heart problems or risk factors for heart disease?\" (e.g., prior heart attack, angina; high blood pressure, diabetes, being overweight, high cholesterol, smoking, or strong family history of heart disease)      Had high blood pressure years ago with stress; going through divorce now but hasn't checked bp.  Was never on meds.   was homeless this past year.  His mother has had strokes.  Thinks first was at 45-50yrs of age  8. PULMONARY RISK FACTORS: \"Do you have any history of lung disease?\"  (e.g., blood clots in lung, asthma, emphysema, birth control pills)      Patient states had lung damage in college; inhaled shoe dust/glue when working.   had had exercised induced asthma years ago but not recently.  Was able to ride bike 5 miles after COVID.  9. CAUSE: \"What do you think is causing the chest pain?\"      unknown  10. OTHER SYMPTOMS: \"Do you have any other symptoms?\" (e.g., dizziness, nausea, vomiting, sweating, fever, difficulty breathing, cough)        States at times he gets sweats.  States appetite is decreased.  Feels a little light headed and slightly dizzy today.  Did walk up/down stair ladders at work today but did feel more tired and slightly light headed.  States chest pain did worsen with this activity.  11. PREGNANCY: \"Is there any chance you are pregnant?\" \"When was your last menstrual period?\"        n/a    Protocols used: CHEST PAIN-A-OH      "

## 2021-03-09 ENCOUNTER — COMMUNICATION - HEALTHEAST (OUTPATIENT)
Dept: CARDIOLOGY | Facility: CLINIC | Age: 41
End: 2021-03-09

## 2021-03-10 ENCOUNTER — OFFICE VISIT - HEALTHEAST (OUTPATIENT)
Dept: CARDIOLOGY | Facility: CLINIC | Age: 41
End: 2021-03-10

## 2021-03-10 DIAGNOSIS — Z86.16 HISTORY OF COVID-19: ICD-10-CM

## 2021-03-10 DIAGNOSIS — G47.33 OSA (OBSTRUCTIVE SLEEP APNEA): ICD-10-CM

## 2021-03-10 DIAGNOSIS — R07.9 CHEST PAIN, UNSPECIFIED TYPE: ICD-10-CM

## 2021-03-23 ENCOUNTER — HOSPITAL ENCOUNTER (OUTPATIENT)
Dept: CARDIOLOGY | Facility: HOSPITAL | Age: 41
Discharge: HOME | End: 2021-03-23
Attending: GENERAL ACUTE CARE HOSPITAL

## 2021-03-23 DIAGNOSIS — R07.9 CHEST PAIN, UNSPECIFIED TYPE: ICD-10-CM

## 2021-03-23 LAB
CV STRESS CURRENT BP HE: NORMAL
CV STRESS CURRENT HR HE: 100
CV STRESS CURRENT HR HE: 104
CV STRESS CURRENT HR HE: 105
CV STRESS CURRENT HR HE: 106
CV STRESS CURRENT HR HE: 109
CV STRESS CURRENT HR HE: 110
CV STRESS CURRENT HR HE: 111
CV STRESS CURRENT HR HE: 111
CV STRESS CURRENT HR HE: 116
CV STRESS CURRENT HR HE: 117
CV STRESS CURRENT HR HE: 123
CV STRESS CURRENT HR HE: 131
CV STRESS CURRENT HR HE: 133
CV STRESS CURRENT HR HE: 137
CV STRESS CURRENT HR HE: 138
CV STRESS CURRENT HR HE: 154
CV STRESS CURRENT HR HE: 157
CV STRESS CURRENT HR HE: 166
CV STRESS CURRENT HR HE: 171
CV STRESS CURRENT HR HE: 171
CV STRESS CURRENT HR HE: 172
CV STRESS CURRENT HR HE: 172
CV STRESS CURRENT HR HE: 83
CV STRESS CURRENT HR HE: 83
CV STRESS CURRENT HR HE: 95
CV STRESS CURRENT HR HE: 99
CV STRESS CURRENT HR HE: 99
CV STRESS DEVIATION TIME HE: NORMAL
CV STRESS ECHO PERCENT HR HE: NORMAL
CV STRESS EXERCISE STAGE HE: NORMAL
CV STRESS FINAL RESTING BP HE: NORMAL
CV STRESS FINAL RESTING HR HE: 95
CV STRESS MAX HR HE: 172
CV STRESS MAX TREADMILL GRADE HE: 16
CV STRESS MAX TREADMILL SPEED HE: 4.2
CV STRESS PEAK DIA BP HE: NORMAL
CV STRESS PEAK SYS BP HE: NORMAL
CV STRESS PHASE HE: NORMAL
CV STRESS PROTOCOL HE: NORMAL
CV STRESS RESTING PT POSITION HE: NORMAL
CV STRESS RESTING PT POSITION HE: NORMAL
CV STRESS ST DEVIATION AMOUNT HE: NORMAL
CV STRESS ST DEVIATION ELEVATION HE: NORMAL
CV STRESS ST EVELATION AMOUNT HE: NORMAL
CV STRESS TEST TYPE HE: NORMAL
CV STRESS TOTAL STAGE TIME MIN 1 HE: NORMAL
RATE PRESSURE PRODUCT: NORMAL
STRESS ECHO BASELINE DIASTOLIC HE: 84
STRESS ECHO BASELINE HR: 78
STRESS ECHO BASELINE SYSTOLIC BP: 125
STRESS ECHO CALCULATED PERCENT HR: 96 %
STRESS ECHO LAST STRESS DIASTOLIC BP: 86
STRESS ECHO LAST STRESS HR: 172
STRESS ECHO LAST STRESS SYSTOLIC BP: 178
STRESS ECHO POST ESTIMATED WORKLOAD: 12.1
STRESS ECHO POST EXERCISE DUR MIN: 11
STRESS ECHO POST EXERCISE DUR SEC: 59
STRESS ECHO TARGET HR: 180

## 2021-04-16 ENCOUNTER — E-VISIT (OUTPATIENT)
Dept: URGENT CARE | Facility: CLINIC | Age: 41
End: 2021-04-16
Payer: COMMERCIAL

## 2021-04-16 DIAGNOSIS — Z20.822 SUSPECTED COVID-19 VIRUS INFECTION: Primary | ICD-10-CM

## 2021-04-16 DIAGNOSIS — Z20.822 SUSPECTED COVID-19 VIRUS INFECTION: ICD-10-CM

## 2021-04-16 PROCEDURE — 99421 OL DIG E/M SVC 5-10 MIN: CPT | Performed by: NURSE PRACTITIONER

## 2021-04-16 PROCEDURE — U0003 INFECTIOUS AGENT DETECTION BY NUCLEIC ACID (DNA OR RNA); SEVERE ACUTE RESPIRATORY SYNDROME CORONAVIRUS 2 (SARS-COV-2) (CORONAVIRUS DISEASE [COVID-19]), AMPLIFIED PROBE TECHNIQUE, MAKING USE OF HIGH THROUGHPUT TECHNOLOGIES AS DESCRIBED BY CMS-2020-01-R: HCPCS | Performed by: NURSE PRACTITIONER

## 2021-04-16 PROCEDURE — U0005 INFEC AGEN DETEC AMPLI PROBE: HCPCS | Performed by: NURSE PRACTITIONER

## 2021-04-16 NOTE — PATIENT INSTRUCTIONS
Dear Loki Aguirre,    Your symptoms show that you may have coronavirus (COVID-19). This illness can cause fever, cough and trouble breathing. Many people get a mild case and get better on their own. Some people can get very sick.    Will I be tested for COVID-19?  We would like to test you for Covid-19 virus. I have placed orders for this test.     To schedule: go to your [employer] home page and scroll down to the section that says  You have an appointment that needs to be scheduled  and click the large green button that says  Schedule Now  and follow the steps to find the next available openings.    If you are unable to complete these [employer] scheduling steps, please call [phone] to schedule your testing.     Return to work/school/ guidance:  Please let your workplace manager and staffing office know when your quarantine ends     We can t give you an exact date as it depends on the above. You can calculate this on your own or work with your manager/staffing office to calculate this. (For example if you were exposed on 10/4, you would have to quarantine for 14 full days. That would be through 10/18. You could return on 10/19.)      If you receive a positive COVID-19 test result, follow the guidance of the those who are giving you the results. Usually the return to work is 10 (or in some cases 20 days from symptom onset.) If you work at [facility], you must also be cleared by Employee Occupational Health and Safety to return to work.        If you receive a negative COVID-19 test result and did not have a high risk exposure to someone with a known positive COVID-19 test, you can return to work once you're free of fever for 24 hours without fever-reducing medication and your symptoms are improving or resolved.      If you receive a negative COVID-19 test and If you had a high risk exposure to someone who has tested positive for COVID-19 then you can return to work 14 days after your last contact  with the positive individual    Note: If you have ongoing exposure to the covid positive person, this quarantine period may be more than 14 days. (For example, if you are continued to be exposed to your child who tested positive and cannot isolate from them, then the quarantine of 7-14 days can't start until your child is no longer contagious. This is typically 10 days from onset of the child's symptoms. So the total duration may be 17-24 days in this case.)    Sign up for [employer].   We know it's scary to hear that you might have COVID-19. We want to track your symptoms to make sure you're okay over the next 2 weeks. Please look for an email from [employer]--this is a free, online program that we'll use to keep in touch. To sign up, follow the link in the email you will receive. Learn more at [email or web address]    How can I take care of myself?    Get lots of rest. Drink extra fluids (unless a doctor has told you not to)    Take Tylenol (acetaminophen) or ibuprofen for fever or pain. If you have liver or kidney problems, ask your family doctor if it's okay to take Tylenol o ibuprofen    If you have other health problems (like cancer, heart failure, an organ transplant or severe kidney disease): Call your specialty clinic if you don't feel better in the next 2 days.    Know when to call 911. Emergency warning signs include:  o Trouble breathing or shortness of breath  o Pain or pressure in the chest that doesn't go away  o Feeling confused like you haven't felt before, or not being able to wake up  o Bluish-colored lips or face    Where can I get more information?  [facility] - About COVID-19:   [email or web address]    CDC - What to Do If You're Sick:   www.cdc.gov/coronavirus/2019-ncov/about/steps-when-sick.html    April 16, 2021  RE:  Loki Aguirre                                                                                                                  [address]  [facility] [address]      To whom it may concern:    I evaluated Loki Aguirre on April 16, 2021. Loki Aguirre should be excused from work/school.     They should let their workplace manager and staffing office know when their quarantine ends.    We can not give an exact date as it depends on the information below. They can calculate this on their own or work with their manager/staffing office to calculate this. (For example if they were exposed on 10/04, they would have to quarantine for 14 full days. That would be through 10/18. They could return on 10/19.)    Quarantine Guidelines:      If patient receives a positive COVID-19 test result, they should follow the guidance of those who are giving the results. Usually the return to work is 10 (or in some cases 20 days from symptom onset.) If they work at [employer], they must be cleared by Employee Occupational Health and Safety to return to work.        If patient receives a negative COVID-19 test result and did not have a high risk exposure to someone with a known positive COVID-19 test, they can return to work once they're free of fever for 24 hours without fever-reducing medication and their symptoms are improving or resolved.      If patient receives a negative COVID-19 test and if they had a high risk exposure to someone who has tested positive for COVID-19 then they can return to work 14 days after their last contact with the positive individual    Note: If there is ongoing exposure to the covid positive person, this quarantine period may be longer than 14 days. (For example, if they are continually exposed to their child, who tested positive and cannot isolate from them, then the quarantine of 7-14 days can't start until their child is no longer contagious. This is typically 10 days from onset to the child's symptoms. So the total duration may be 17-24 days in this case.)     Sincerely,  Jordyn Brenner, CNP

## 2021-04-17 LAB
SARS-COV-2 RNA RESP QL NAA+PROBE: NOT DETECTED
SPECIMEN SOURCE: NORMAL

## 2021-06-05 VITALS
OXYGEN SATURATION: 97 % | BODY MASS INDEX: 24.66 KG/M2 | SYSTOLIC BLOOD PRESSURE: 128 MMHG | WEIGHT: 162.2 LBS | RESPIRATION RATE: 14 BRPM | HEART RATE: 87 BPM | DIASTOLIC BLOOD PRESSURE: 70 MMHG

## 2021-06-15 NOTE — TELEPHONE ENCOUNTER

## 2021-06-18 NOTE — PATIENT INSTRUCTIONS - HE
Patient Instructions by Jonathan Iyer MD at 3/10/2021  7:50 AM     Author: Jonathan Iyer MD Service: -- Author Type: Physician    Filed: 3/10/2021  8:15 AM Encounter Date: 3/10/2021 Status: Signed    : Jonathan Iyer MD (Physician)       1. We will schedule a stress test to see if your pain may be related to your heart.  2. If your stress test looks normal, no further testing is needed at this time.    Patient Education     Noncardiac Chest Pain    Based on your visit today, the healthcare provider doesnt know what is causing your chest pain. In most cases, people who come to the emergency department with chest pain dont have a problem with their heart. Instead, the pain is caused by other conditions. It's important for the healthcare team to be sure you are not having a life threatening cause for chest pain such as a heart attack, blood clot in the lungs, collapsed lung, ruptured esophagus, or tearing of the aorta. Once these major causes have been ruled out, you may have further evaluation for non-heart causes of chest pain. These may be problems with the lungs, muscles, bones, digestive tract, nerves, or mental health.  Lung problems    Inflammation around the lungs (pleurisy)    Collapsed lung (pneumothorax)    Fluid around the lungs (pleural effusion)    Lung cancer (a rare cause of chest pain)  Muscle or bone problems    Inflamed cartilage between the ribs (costochondritis)    Fibromyalgia    Rheumatoid arthritis    Chest wall strain  Digestive system problems    Reflux    Stomach ulcer    Spasms of the esophagus    Gall stones    Gallbladder inflammation  Mental health conditions    Panic or anxiety attacks    Emotional distress  Your condition doesnt seem serious and your pain doesnt appear to be coming from your heart. But sometimes the signs of a serious problem take more time to appear. Watch for the warning signs listed below.  Home care  Follow these guidelines when caring for  yourself at home:    Rest today and avoid strenuous activity.    Take any prescribed medicine as directed.  Follow-up care  Follow up with your healthcare provider, or as advised, if you dont start to feel better within 24 hours.  When to seek medical advice  Call your healthcare provider right away if any of these occur:    A change in the type of pain. Call if it feels different, becomes more serious, lasts longer, or begins to spread into your shoulder, arm, neck, jaw, or back.    Shortness of breath    You feel more pain when you breathe    Cough with dark-colored mucus or blood    Weakness, dizziness, or fainting    Fever of 100.4 F (38 C) or higher, or as directed by your healthcare provider    Swelling, pain, or redness in one leg  Date Last Reviewed: 12/1/2016 2000-2017 [employer]. [address] [place] [address]. All rights reserved. This information is not intended as a substitute for professional medical care. Always follow your healthcare professional's instructions.

## 2021-06-20 NOTE — LETTER
Letter by Lisa Martinez RN at      Author: Lisa Martinez RN Service: -- Author Type: --    Filed:  Encounter Date: 6/23/2020 Status: (Other)       6/23/2020        Loki Aguirre  [address] [place] [address] [place]  [facility] [address]    This letter provides a written record that you were tested for COVID-19 on 6/21/20.     Your result was negative. This means that we didnt find the virus that causes COVID-19 in your sample. A test may show negative when you do actually have the virus. This can happen when the virus is in the early stages of infection, before you feel illness symptoms.    If you have symptoms   Stay home and away from others (self-isolate) until you meet ALL of the guidelines below:    Youve had no fever--and no medicine that reduces fever--for 3 full days (72 hours). And ?    Your other symptoms have gotten better. For example, your cough or breathing has improved. And?    At least 10 days have passed since your symptoms started.    During this time:    Stay home. Dont go to work, school or anywhere else.     Stay in your own room, including for meals. Use your own bathroom if you can.    Stay away from others in your home. No hugging, kissing or shaking hands. No visitors.    Clean high touch surfaces often (doorknobs, counters, handles, etc.). Use a household cleaning spray or wipes. You can find a full list on the EPA website at www.epa.gov/pesticide-registration/list-n-disinfectants-use-against-sars-cov-2.    Cover your mouth and nose with a mask, tissue or washcloth to avoid spreading germs.    Wash your hands and face often with soap and water.    Going back to work  Check with your employer for any guidelines to follow for going back to work.    Employers: This document serves as formal notice that your employee tested negative for COVID-19, as of the testing date shown above.

## 2021-06-20 NOTE — LETTER
Letter by Yolanda Vallejo PA-C at      Author: Yolanda Vallejo PA-C Service: -- Author Type: --    Filed:  Encounter Date: 6/27/2020 Status: (Other)         June 27, 2020     Patient: Loki Aguirre   YOB: 1980   Date of Visit: 6/27/2020       To Whom It May Concern:    It is my medical opinion that Loki Aguirre may return to full duty immediately with no restrictions.    Results for orders placed or performed in visit on 06/21/20   Rapid Strep A Screen-Throat    Specimen: Throat   Result Value Ref Range    Rapid Strep A Antigen Group A Strep detected (!) No Group A Strep detected, presumptive negative   COVID-19 Virus PCR MRF    Specimen: Nasopharyngeal Swab; Respiratory   Result Value Ref Range    COVID-19 VIRUS SPECIMEN SOURCE Nasopharyngeal     2019-nCOV Not Detected          If you have any questions or concerns, please don't hesitate to call.    Sincerely,        Electronically signed by Yolanda Vallejo PA-C

## 2021-06-30 NOTE — PROGRESS NOTES
Progress Notes by Jonathan Iyer MD at 3/10/2021  7:50 AM     Author: Jonathan Iyer MD Service: -- Author Type: Physician    Filed: 3/10/2021  8:26 AM Encounter Date: 3/10/2021 Status: Signed    : Jonathan Iyer MD (Physician)           Thank you, Dr. Stephen Siddiqui for asking the [facility] team to see Mr. Loki Aguirre to evaluate chest pain.      Assessment/Recommendations   Assessment:    1. Chest pain. Seems atypical although age and gender slightly increase his risk of having coronary artery disease.  2. History of COVID-19 infection. Do not suspect this is a COVID-19 related cardiovascular manifestation.   3. Obstructive sleep apnea not on home CPAP.    Plan:  1. Offered continued observation versus stress testing. He would like additional reassurance so will schedule an exercise stress echocardiogram.  2. Recommended healthy diet and regular exercise.  3. Also suggested trying CPAP therapy again if he feels tired during the day.  4. Follow-up as needed.         History of Present Illness   Mr. Loki Aguirre is a 40 y.o. male with a significant past history of sleep apnea not on CPAP and COVID-19 infection in 2020 presenting for evaluation of chest pressure. It is left-sided chest pressure, maybe radiates to his left arm. Fairly persistent for the past several days. Not worse with exertion, better with pain medication. Has also noticed swelling and loss of feeling in his left arm at times.     He presented to the ED for this 3/8/2021. Vitals were stable. ECG was normal. Troponin and BNP were normal. This has been a stressful last several months for him, getting COVID-19, getting , and being homeless for a time. He saw a cardiologist in [place] at one point for some reason. He had blood work done including cholesterol, which he was told looked normal. He was diagnosed with GUSTAVO years ago but does not currently use a CPAP.    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 His chest pain is not worse with this. He also denies any shortness of breath at rest or with exertion, light headedness/dizziness, pre-syncope, syncope, lower extremity swelling, palpitations, paroxysmal nocturnal dyspnea (PND), or orthopnea.     Cardiac Problems and Cardiac Diagnostics     Most Recent Cardiac testing:  ECG dated 3/8/2021 (personaly reviewed and interpreted): SR, normal ECG    CXR 3/8/2021 (personally reviewed and interpreted):   Normal.     Medications  Allergies   Current Outpatient Medications   Medication Sig Dispense Refill   ? ergocalciferol (VITAMIN D2) 1,250 mcg (50,000 unit) capsule Take by mouth.       No current facility-administered medications for this visit.       No Known Allergies     Physical Examination Review of Systems   Vitals:    03/10/21 0742   BP: 128/70   Pulse: 87   Resp: 14   SpO2: 97%     Body mass index is 24.66 kg/m .  Wt Readings from Last 3 Encounters:   03/10/21 162 lb 3.2 oz (73.6 kg)   03/08/21 165 lb 1.6 oz (74.9 kg)       General Appearance:   Pleasant  male, appears  stated age. no acute distress, normal body habitus   ENT/Mouth: Facemask.    EYES:  no scleral icterus, normal conjunctivae   Neck: no carotid bruits. No anterior cervical lymphadenopaty   Respiratory:   lungs are clear to auscultation, no rales or wheezing,  equal chest wall expansion    Cardiovascular:   Regular rhythm, normal rate. Normal first and second heart sounds with no murmurs, rubs, or gallops; the carotid, radial and posterior tibial pulses are intact, Jugular venous pressure normal, no edema bilaterally    Abdomen/GI:  no organomegaly, masses, bruits, or tenderness; bowel sounds are present   Extremities: no cyanosis or clubbing   Skin: no xanthelasma, warm.    Heme/lymph/ Immunology No apparent bleeding noted.   Neurologic: Alert and oriented. normal gait, no tremors     Psychiatric: Pleasant, calm, appropriate affect.    A complete 10  system review of systems was performed and is negative except as mentioned in the HPI or below:  General: Weight Loss  Eyes: Visual Distubance     Lungs: Shortness of Breath, Snoring  Heart: Chest Pain, Arm Pain, Shortness of Breath with activity(arm swelling)  Stomach: Constipation, Diarrhea, Heartburn, Nausea     Muscle/Joints: Muscle Weakness, Muscle Pain  Skin: WNL  Nervous System: Dizziness, Loss of Balance  Mental Health: Depression     Blood: Easy Bruising(left side only)       Past History   Past Medical History:   GUSTAVO    Past Surgical History:   No past surgical history on file.    Family History:   No history of premature cardiovascular disease.    Social History:   Social History     Socioeconomic History   ? Marital status: Single     Spouse name: Not on file   ? Number of children: Not on file   ? Years of education: Not on file   ? Highest education level: Not on file   Occupational History   ? Not on file   Social Needs   ? Financial resource strain: Not on file   ? Food insecurity     Worry: Not on file     Inability: Not on file   ? Transportation needs     Medical: Not on file     Non-medical: Not on file   Tobacco Use   ? Smoking status: Never Smoker   ? Smokeless tobacco: Never Used   Substance and Sexual Activity   ? Alcohol use: Yes     Frequency: Monthly or less   ? Drug use: Never   ? Sexual activity: Not on file   Lifestyle   ? Physical activity     Days per week: Not on file     Minutes per session: Not on file   ? Stress: Not on file   Relationships   ? Social connections     Talks on phone: Not on file     Gets together: Not on file     Attends Nondenominational service: Not on file     Active member of club or organization: Not on file     Attends meetings of clubs or organizations: Not on file     Relationship status: Not on file   ? Intimate partner violence     Fear of current or ex partner: Not on file     Emotionally abused: Not on file     Physically abused: Not on file     Forced sexual  activity: Not on file   Other Topics Concern   ? Not on file   Social History Narrative   ? Not on file              Lab Results    Chemistry/lipid CBC Cardiac Enzymes/BNP/TSH/INR   Lab Results   Component Value Date    CREATININE 0.82 03/08/2021    BUN 15 03/08/2021    K 3.6 03/08/2021     03/08/2021     03/08/2021    CO2 27 03/08/2021    Lab Results   Component Value Date    WBC 6.2 03/08/2021    HGB 14.4 03/08/2021    HCT 42.3 03/08/2021    MCV 89 03/08/2021     03/08/2021    Lab Results   Component Value Date    TROPONINI <0.01 03/08/2021    BNP 22 03/08/2021        Jonathan Iyer MD [facility]  Non-Invasive Cardiologist  [facility]  Pager [phone]

## 2021-10-10 ENCOUNTER — HEALTH MAINTENANCE LETTER (OUTPATIENT)
Age: 41
End: 2021-10-10

## 2022-01-30 ENCOUNTER — HEALTH MAINTENANCE LETTER (OUTPATIENT)
Age: 42
End: 2022-01-30

## 2022-09-18 ENCOUNTER — HEALTH MAINTENANCE LETTER (OUTPATIENT)
Age: 42
End: 2022-09-18

## 2023-05-07 ENCOUNTER — HEALTH MAINTENANCE LETTER (OUTPATIENT)
Age: 43
End: 2023-05-07

## 2024-02-02 ENCOUNTER — OFFICE VISIT (OUTPATIENT)
Dept: FAMILY MEDICINE | Facility: CLINIC | Age: 44
End: 2024-02-02
Payer: COMMERCIAL

## 2024-02-02 VITALS
DIASTOLIC BLOOD PRESSURE: 85 MMHG | SYSTOLIC BLOOD PRESSURE: 125 MMHG | WEIGHT: 159 LBS | RESPIRATION RATE: 16 BRPM | HEIGHT: 68 IN | HEART RATE: 81 BPM | BODY MASS INDEX: 24.1 KG/M2 | OXYGEN SATURATION: 97 % | TEMPERATURE: 96.9 F

## 2024-02-02 DIAGNOSIS — H26.9 CATARACT OF BOTH EYES, UNSPECIFIED CATARACT TYPE: ICD-10-CM

## 2024-02-02 DIAGNOSIS — Z01.818 PRE-OP EXAM: Primary | ICD-10-CM

## 2024-02-02 PROCEDURE — 99204 OFFICE O/P NEW MOD 45 MIN: CPT | Performed by: PHYSICIAN ASSISTANT

## 2024-02-02 ASSESSMENT — PATIENT HEALTH QUESTIONNAIRE - PHQ9
SUM OF ALL RESPONSES TO PHQ QUESTIONS 1-9: 9
10. IF YOU CHECKED OFF ANY PROBLEMS, HOW DIFFICULT HAVE THESE PROBLEMS MADE IT FOR YOU TO DO YOUR WORK, TAKE CARE OF THINGS AT HOME, OR GET ALONG WITH OTHER PEOPLE: SOMEWHAT DIFFICULT
SUM OF ALL RESPONSES TO PHQ QUESTIONS 1-9: 9

## 2024-02-02 ASSESSMENT — PAIN SCALES - GENERAL: PAINLEVEL: NO PAIN (0)

## 2024-02-02 NOTE — PROGRESS NOTES
Preoperative Evaluation  [facility]  [address], [address]  [facility] [address]  Phone: [phone]  Primary Provider: No Ref-Primary, Physician  Pre-op Performing Provider: SKYLAR BLOOD  Feb 2, 2024       Jona is a 43 year old, presenting for the following:  Pre-Op Exam    Surgical Information  Surgery/Procedure: Cataract Removal Left Eye  Surgery Location: [facility]  Surgeon: Denise Guardado  Surgery Date: 02/19/2024  Time of Surgery: TBD  Where patient plans to recover: At home with family  Fax number for surgical facility: [phone] and [phone]    Assessment & Plan     The proposed surgical procedure is considered LOW risk.    Pre-op exam  Cataract of both eyes, unspecified cataract type    Cleared for surgery without further investigation.  No medication modifications.  Comfortable with plan.  Will fax H&P to surgery team.     - No identified additional risk factors other than previously addressed      Recommendation  APPROVAL GIVEN to proceed with proposed procedure, without further diagnostic evaluation.      20 minutes spent by me on the date of the encounter doing chart review, review of test results, interpretation of tests, patient visit, and documentation     Subjective       HPI related to upcoming procedure:     Here today for preoperative clearance for upcoming cataract removal.  Scheduled for 2/19/2024.  No issues anesthesia in the past.  No history of heart attack, stroke, or blood clot.  Denies chest pain, shortness of breath, heart racing, or leg swelling.        2/2/2024     9:39 AM   Preop Questions   1. Have you ever had a heart attack or stroke? No   2. Have you ever had surgery on your heart or blood vessels, such as a stent placement, a coronary artery bypass, or surgery on an artery in your head, neck, heart, or legs? No   3. Do you have chest pain with activity? No   4. Do you have a history of  heart failure? No   5. Do you  currently have a cold, bronchitis or symptoms of other infection? No   6. Do you have a cough, shortness of breath, or wheezing? No   7. Do you or anyone in your family have previous history of blood clots? No   8. Do you or does anyone in your family have a serious bleeding problem such as prolonged bleeding following surgeries or cuts? No   9. Have you ever had problems with anemia or been told to take iron pills? No   10. Have you had any abnormal blood loss such as black, tarry or bloody stools? No   11. Have you ever had a blood transfusion? No   12. Are you willing to have a blood transfusion if it is medically needed before, during, or after your surgery? Yes   13. Have you or any of your relatives ever had problems with anesthesia? No   14. Do you have sleep apnea, excessive snoring or daytime drowsiness? No   15. Do you have any artifical heart valves or other implanted medical devices like a pacemaker, defibrillator, or continuous glucose monitor? No   16. Do you have artificial joints? No   17. Are you allergic to latex? No       Health Care Directive  Patient does not have a Health Care Directive or Living Will:           Patient Active Problem List    Diagnosis Date Noted    Attention deficit hyperactivity disorder (ADHD), unspecified ADHD type 09/17/2019     Priority: Medium      Past Medical History:   Diagnosis Date    NO ACTIVE PROBLEMS      Past Surgical History:   Procedure Laterality Date    wisdom teeth       Current Outpatient Medications   Medication Sig Dispense Refill    aspirin-acetaminophen-caffeine (EXCEDRIN MIGRAINE) 250-250-65 MG tablet Take 1 tablet by mouth every 6 hours as needed for headaches         No Known Allergies     Social History     Tobacco Use    Smoking status: Never    Smokeless tobacco: Never   Substance Use Topics    Alcohol use: Yes     Family History   Problem Relation Age of Onset    Breast Cancer Mother     Depression Mother     Hypertension Mother         She  "takes statin    Arthritis Mother     Cataracts Father     Lupus Maternal Grandmother     Neurologic Disorder Paternal Grandfather      History   Drug Use Unknown         Review of Systems    Review of Systems  Constitutional, neuro, ENT, endocrine, pulmonary, cardiac, gastrointestinal, genitourinary, musculoskeletal, integument and psychiatric systems are negative, except as otherwise noted.  Objective    There were no vitals taken for this visit.   Estimated body mass index is 24.66 kg/m  as calculated from the following:    Height as of 3/8/21: 1.727 m (5' 8\").    Weight as of 3/10/21: 73.6 kg (162 lb 3.2 oz).  Physical Exam  GENERAL: alert and no distress  EYES: Eyes grossly normal to inspection, PERRL and conjunctivae and sclerae normal  NECK: no adenopathy, no asymmetry, masses, or scars  RESP: lungs clear to auscultation - no rales, rhonchi or wheezes  CV: regular rate and rhythm, normal S1 S2, no S3 or S4, no murmur, click or rub, no peripheral edema  MS: no gross musculoskeletal defects noted, no edema  SKIN: no suspicious lesions or rashes  NEURO: Normal strength and tone, mentation intact and speech normal  PSYCH: mentation appears normal, affect normal/bright  LYMPH: no cervical, supraclavicular, axillary, or inguinal adenopathy      Diagnostics  No labs were ordered during this visit.   No EKG required, no history of coronary heart disease, significant arrhythmia, peripheral arterial disease or other structural heart disease.    Revised Cardiac Risk Index (RCRI)  The patient has the following serious cardiovascular risks for perioperative complications:   - No serious cardiac risks = 0 points     RCRI Interpretation: 0 points: Class I (very low risk - 0.4% complication rate)    The likelihood of other entities in the differential is insufficient to justify any further testing for them at this time. This was explained to the patient. The patient was advised that persistent or worsening symptoms would " require further evaluation. Patient advised to call the office and if unable to reach to go to the emergency room if they develop any new or worsening symptoms. Expressed understanding and agreement with above stated plan.     Signed Electronically by: Lang Guerrero PA-C  Copy of this evaluation report is provided to requesting physician.

## 2024-02-02 NOTE — COMMUNITY RESOURCES LIST (ENGLISH)
02/02/2024   [facility]  N/A  For questions about this resource list or additional care needs, please contact your primary care clinic or care manager.  Phone: [phone]   Email: N/A   Address: [address] [place] [address]   Hours: N/A        Food and Nutrition       Food pantry  1  [facility] Distance: 0.37 miles      Pickup   [address] [place] [address]  Language: English  Hours: Wed 11:00 AM - 2:00 PM , Sat 9:00 AM - 11:30 PM  Fees: Free   Phone: (364) 727-7304 Ext.205 Email: foodshelf@[facility] CenterSpotjournal Website: http://www.Ku6     2  [facility] Distance: 0.54 miles      Pickup   [address] [place] [address]  Language: English, Central African  Hours: Fri 11:00 AM - 12:00 PM  Fees: Free   Phone: (672) 755-1434 Email: Info@[facility].[employer] Website: http://HackMyPic.[employer]/     SNAP application assistance  3  [employer] [facility] Distance: 0.88 miles      In-Person   [address] [id number]  Language: English, Central African  Hours: Mon - Fri 8:30 AM - 5:00 PM  Fees: Free   Phone: (994) 703-1126 Email: info@[employer].org Website: http://www.[employer].org/     4  [facility] Office of Multicultural Services Distance: 1.19 miles      Phone/Virtual   [address] [place] [address]  Language: American Sign Language, Hebrew, Czech, English, Turkmen, Kyrgyz, Oromo, Singaporean, Indonesian, Central African, Swahili, French, Slovenian  Hours: Mon - Tue 9:00 AM - 4:00 PM , Wed 10:00 AM - 5:00 PM , Thu - Fri 9:00 AM - 4:00 PM  Fees: Free   Phone: (183) 372-8444 Email: oms@[place]. Website: http://www.[facility]/residents/human-services/multi-cultural-services     Soup kitchen or free meals  5  [employer] [facility] Distance: 0.92 miles      In-Person   [address] [place] [address]  Language:  English  Hours: Mon - Wed 12:00 PM - 1:00 PM  Fees: Free   Phone: (695) 808-5245 Email: kassidy@[email or web address].[email or web address] Website: [email or web address]/[facility]/SouthMinneafletcheris/     6  [facility] [facility] [place] Distance: 1.15 miles      In-Person   [address] [place] [address]  Language: English  Hours: Mon - Fri 12:00 PM - 1:00 PM  Fees: Free   Phone: (890) 231-7645 Email: info@[employer].org Website: http://www.[facility]plazzaR[facility].org/locations/dai_tree          Important Numbers & Websites       Emergency Services   911  [facility]   311  Poison Control   (898) 878-5140  Suicide Prevention Lifeline   (144) 716-4111 (TALK)  Child Abuse Hotline   (482) 332-8907 (4-A-Child)  Sexual Assault Hotline   (396) 522-4024 (HOPE)  National Runaway Safeline   (123) 179-1884 (RUNAWAY)  All-Options Talkline   (940) 920-5865  Substance Abuse Referral   (112) 604-5838 (HELP)

## 2024-07-14 ENCOUNTER — HEALTH MAINTENANCE LETTER (OUTPATIENT)
Age: 44
End: 2024-07-14

## 2025-07-19 ENCOUNTER — HEALTH MAINTENANCE LETTER (OUTPATIENT)
Age: 45
End: 2025-07-19